# Patient Record
Sex: FEMALE | Race: WHITE | NOT HISPANIC OR LATINO
[De-identification: names, ages, dates, MRNs, and addresses within clinical notes are randomized per-mention and may not be internally consistent; named-entity substitution may affect disease eponyms.]

---

## 2017-12-06 ENCOUNTER — RESULT REVIEW (OUTPATIENT)
Age: 27
End: 2017-12-06

## 2018-12-19 ENCOUNTER — RESULT REVIEW (OUTPATIENT)
Age: 28
End: 2018-12-19

## 2019-07-10 PROBLEM — Z00.00 ENCOUNTER FOR PREVENTIVE HEALTH EXAMINATION: Status: ACTIVE | Noted: 2019-07-10

## 2019-07-29 ENCOUNTER — APPOINTMENT (OUTPATIENT)
Dept: OBGYN | Facility: CLINIC | Age: 29
End: 2019-07-29
Payer: COMMERCIAL

## 2019-07-29 DIAGNOSIS — Z78.9 OTHER SPECIFIED HEALTH STATUS: ICD-10-CM

## 2019-07-29 DIAGNOSIS — Z82.49 FAMILY HISTORY OF ISCHEMIC HEART DISEASE AND OTHER DISEASES OF THE CIRCULATORY SYSTEM: ICD-10-CM

## 2019-07-29 DIAGNOSIS — F17.200 NICOTINE DEPENDENCE, UNSPECIFIED, UNCOMPLICATED: ICD-10-CM

## 2019-07-29 PROCEDURE — 99202 OFFICE O/P NEW SF 15 MIN: CPT

## 2020-02-10 ENCOUNTER — APPOINTMENT (OUTPATIENT)
Dept: OBGYN | Facility: CLINIC | Age: 30
End: 2020-02-10
Payer: COMMERCIAL

## 2020-02-10 VITALS
HEIGHT: 65 IN | DIASTOLIC BLOOD PRESSURE: 60 MMHG | SYSTOLIC BLOOD PRESSURE: 90 MMHG | WEIGHT: 119 LBS | BODY MASS INDEX: 19.83 KG/M2

## 2020-02-10 DIAGNOSIS — Z01.419 ENCOUNTER FOR GYNECOLOGICAL EXAMINATION (GENERAL) (ROUTINE) W/OUT ABNORMAL FINDINGS: ICD-10-CM

## 2020-02-10 PROCEDURE — 99395 PREV VISIT EST AGE 18-39: CPT

## 2020-02-14 LAB — CYTOLOGY CVX/VAG DOC THIN PREP: ABNORMAL

## 2020-03-02 NOTE — END OF VISIT
[FreeTextEntry3] : Documented by Chichi Joshi acting as a scribe for Dr. Ying Bajwa. 03/02/2020 \par \par All medical record entries made by the Scribe were at my, Dr. Ying Bajwa, direction and personally dictated by me on 03/02/2020 . I have reviewed the chart and agree that the record accurately reflects my personal performance of the history, physical exam, assessment and plan. I have also personally directed, and reviewed the chart and I edited where appropriate.

## 2020-03-05 ENCOUNTER — RX CHANGE (OUTPATIENT)
Age: 30
End: 2020-03-05

## 2020-03-05 RX ORDER — ASCORBIC ACID, CHOLECALCIFEROL, .ALPHA.-TOCOPHEROL ACETATE, DL-, PYRIDOXINE, FOLIC ACID, CYANOCOBALAMIN, CALCIUM, FERROUS FUMARATE, MAGNESIUM, DOCONEXENT 85; 200; 10; 25; 1; 12; 140; 27; 45; 300 [IU]/1; [IU]/1; [IU]/1; [IU]/1; MG/1; UG/1; MG/1; MG/1; MG/1; MG/1
27-0.6-0.4-3 CAPSULE, GELATIN COATED ORAL DAILY
Qty: 30 | Refills: 11 | Status: DISCONTINUED | COMMUNITY
Start: 2019-07-29 | End: 2020-03-05

## 2020-12-23 PROBLEM — Z01.419 ENCOUNTER FOR GYNECOLOGICAL EXAMINATION WITHOUT ABNORMAL FINDING: Status: RESOLVED | Noted: 2020-02-10 | Resolved: 2020-12-23

## 2021-02-10 ENCOUNTER — APPOINTMENT (OUTPATIENT)
Dept: OBGYN | Facility: CLINIC | Age: 31
End: 2021-02-10
Payer: COMMERCIAL

## 2021-02-10 VITALS
DIASTOLIC BLOOD PRESSURE: 70 MMHG | BODY MASS INDEX: 19.66 KG/M2 | WEIGHT: 118 LBS | SYSTOLIC BLOOD PRESSURE: 120 MMHG | HEIGHT: 65 IN | TEMPERATURE: 96.8 F

## 2021-02-10 PROCEDURE — 36415 COLL VENOUS BLD VENIPUNCTURE: CPT

## 2021-02-10 PROCEDURE — 99395 PREV VISIT EST AGE 18-39: CPT

## 2021-02-10 PROCEDURE — 99072 ADDL SUPL MATRL&STAF TM PHE: CPT

## 2021-02-11 LAB
C TRACH RRNA SPEC QL NAA+PROBE: NOT DETECTED
CANDIDA VAG CYTO: NOT DETECTED
G VAGINALIS+PREV SP MTYP VAG QL MICRO: DETECTED
N GONORRHOEA RRNA SPEC QL NAA+PROBE: NOT DETECTED
PROGEST SERPL-MCNC: 14.7 NG/ML
SOURCE TP AMPLIFICATION: NORMAL
T VAGINALIS VAG QL WET PREP: NOT DETECTED

## 2021-02-16 LAB — CYTOLOGY CVX/VAG DOC THIN PREP: ABNORMAL

## 2021-02-22 LAB
DHEA-S SERPL-MCNC: 255 UG/DL
ESTRADIOL SERPL-MCNC: 22 PG/ML
FSH SERPL-MCNC: 5.8 IU/L
HCG SERPL-MCNC: <1 MIU/ML
LH SERPL-ACNC: 10.2 IU/L
PROLACTIN SERPL-MCNC: 16.9 NG/ML
TESTOST SERPL-MCNC: 26.2 NG/DL
TSH SERPL-ACNC: 0.66 UIU/ML

## 2021-04-22 ENCOUNTER — TRANSCRIPTION ENCOUNTER (OUTPATIENT)
Age: 31
End: 2021-04-22

## 2021-07-26 ENCOUNTER — TRANSCRIPTION ENCOUNTER (OUTPATIENT)
Age: 31
End: 2021-07-26

## 2022-01-18 ENCOUNTER — APPOINTMENT (OUTPATIENT)
Dept: OBGYN | Facility: CLINIC | Age: 32
End: 2022-01-18
Payer: COMMERCIAL

## 2022-01-18 VITALS
BODY MASS INDEX: 19.83 KG/M2 | HEIGHT: 65 IN | WEIGHT: 119 LBS | SYSTOLIC BLOOD PRESSURE: 100 MMHG | DIASTOLIC BLOOD PRESSURE: 72 MMHG

## 2022-01-18 PROCEDURE — 99213 OFFICE O/P EST LOW 20 MIN: CPT

## 2022-01-18 RX ORDER — METRONIDAZOLE 7.5 MG/G
0.75 GEL VAGINAL
Qty: 1 | Refills: 0 | Status: DISCONTINUED | COMMUNITY
Start: 2021-02-11 | End: 2022-01-18

## 2022-01-18 RX ORDER — DOXYCYCLINE HYCLATE 100 MG/1
100 CAPSULE ORAL
Qty: 10 | Refills: 0 | Status: DISCONTINUED | COMMUNITY
Start: 2021-02-10 | End: 2022-01-18

## 2022-01-18 RX ORDER — ASCORBIC ACID, CHOLECALCIFEROL, .ALPHA.-TOCOPHEROL ACETATE, DL-, PYRIDOXINE, FOLIC ACID, CYANOCOBALAMIN, CALCIUM, FERROUS FUMARATE, MAGNESIUM, DOCONEXENT 85; 200; 10; 25; 1; 12; 140; 27; 45; 300 [IU]/1; [IU]/1; [IU]/1; [IU]/1; MG/1; UG/1; MG/1; MG/1; MG/1; MG/1
27-0.6-0.4-3 CAPSULE, GELATIN COATED ORAL
Qty: 30 | Refills: 11 | Status: DISCONTINUED | COMMUNITY
Start: 2020-03-05 | End: 2022-01-18

## 2022-01-19 LAB
C TRACH RRNA SPEC QL NAA+PROBE: NOT DETECTED
N GONORRHOEA RRNA SPEC QL NAA+PROBE: NOT DETECTED
SOURCE AMPLIFICATION: NORMAL

## 2022-03-22 ENCOUNTER — APPOINTMENT (OUTPATIENT)
Dept: OBGYN | Facility: CLINIC | Age: 32
End: 2022-03-22
Payer: COMMERCIAL

## 2022-03-22 VITALS
DIASTOLIC BLOOD PRESSURE: 72 MMHG | SYSTOLIC BLOOD PRESSURE: 110 MMHG | HEIGHT: 65 IN | WEIGHT: 116 LBS | BODY MASS INDEX: 19.33 KG/M2

## 2022-03-22 PROCEDURE — 99395 PREV VISIT EST AGE 18-39: CPT

## 2022-04-06 ENCOUNTER — APPOINTMENT (OUTPATIENT)
Dept: OBGYN | Facility: CLINIC | Age: 32
End: 2022-04-06

## 2022-04-06 LAB — CYTOLOGY CVX/VAG DOC THIN PREP: NORMAL

## 2023-04-18 ENCOUNTER — APPOINTMENT (OUTPATIENT)
Dept: OBGYN | Facility: CLINIC | Age: 33
End: 2023-04-18
Payer: COMMERCIAL

## 2023-04-18 ENCOUNTER — LABORATORY RESULT (OUTPATIENT)
Age: 33
End: 2023-04-18

## 2023-04-18 VITALS
WEIGHT: 122 LBS | DIASTOLIC BLOOD PRESSURE: 64 MMHG | SYSTOLIC BLOOD PRESSURE: 120 MMHG | HEIGHT: 65 IN | BODY MASS INDEX: 20.33 KG/M2

## 2023-04-18 DIAGNOSIS — Z87.891 PERSONAL HISTORY OF NICOTINE DEPENDENCE: ICD-10-CM

## 2023-04-18 DIAGNOSIS — Z01.411 ENCOUNTER FOR GYNECOLOGICAL EXAMINATION (GENERAL) (ROUTINE) WITH ABNORMAL FINDINGS: ICD-10-CM

## 2023-04-18 PROCEDURE — 36415 COLL VENOUS BLD VENIPUNCTURE: CPT

## 2023-04-18 PROCEDURE — 76830 TRANSVAGINAL US NON-OB: CPT

## 2023-04-18 PROCEDURE — 99395 PREV VISIT EST AGE 18-39: CPT | Mod: 25

## 2023-04-18 RX ORDER — TERCONAZOLE 4 MG/G
0.4 CREAM VAGINAL DAILY
Qty: 1 | Refills: 1 | Status: DISCONTINUED | COMMUNITY
Start: 2022-03-22 | End: 2023-04-18

## 2023-04-18 RX ORDER — FLUCONAZOLE 150 MG/1
150 TABLET ORAL
Qty: 1 | Refills: 0 | Status: DISCONTINUED | COMMUNITY
Start: 2022-01-18 | End: 2023-04-18

## 2023-04-24 PROBLEM — Z87.891 FORMER SMOKER: Status: ACTIVE | Noted: 2023-04-18

## 2023-04-25 LAB
C TRACH RRNA SPEC QL NAA+PROBE: NOT DETECTED
CYTOLOGY CVX/VAG DOC THIN PREP: ABNORMAL
N GONORRHOEA RRNA SPEC QL NAA+PROBE: NOT DETECTED
SOURCE TP AMPLIFICATION: NORMAL

## 2023-05-11 LAB
BASOPHILS # BLD AUTO: 0.01 K/UL
BASOPHILS NFR BLD AUTO: 0.1 %
EOSINOPHIL # BLD AUTO: 0.19 K/UL
EOSINOPHIL NFR BLD AUTO: 1.9 %
HBV SURFACE AG SER QL: NONREACTIVE
HCT VFR BLD CALC: 39.2 %
HCV AB SER QL: NONREACTIVE
HCV S/CO RATIO: 0.32 S/CO
HGB BLD-MCNC: 13.4 G/DL
HIV1+2 AB SPEC QL IA.RAPID: NONREACTIVE
IMM GRANULOCYTES NFR BLD AUTO: 0.3 %
LYMPHOCYTES # BLD AUTO: 2.03 K/UL
LYMPHOCYTES NFR BLD AUTO: 20.5 %
MAN DIFF?: NORMAL
MCHC RBC-ENTMCNC: 31.1 PG
MCHC RBC-ENTMCNC: 34.2 GM/DL
MCV RBC AUTO: 91 FL
MEV IGG FLD QL IA: 65.8 AU/ML
MEV IGG+IGM SER-IMP: POSITIVE
MONOCYTES # BLD AUTO: 0.38 K/UL
MONOCYTES NFR BLD AUTO: 3.8 %
NEUTROPHILS # BLD AUTO: 7.26 K/UL
NEUTROPHILS NFR BLD AUTO: 73.4 %
PLATELET # BLD AUTO: 257 K/UL
RBC # BLD: 4.31 M/UL
RBC # FLD: 12.1 %
RUBV IGG FLD-ACNC: 4.7 INDEX
RUBV IGG SER-IMP: POSITIVE
T PALLIDUM AB SER QL IA: NEGATIVE
TSH SERPL-ACNC: 0.83 UIU/ML
VZV AB TITR SER: POSITIVE
VZV IGG SER IF-ACNC: 3077 INDEX
WBC # FLD AUTO: 9.9 K/UL

## 2023-05-14 LAB
HGB A MFR BLD: 97 %
HGB A2 MFR BLD: 3 %
HGB FRACT BLD-IMP: NORMAL

## 2023-05-15 ENCOUNTER — NON-APPOINTMENT (OUTPATIENT)
Age: 33
End: 2023-05-15

## 2023-05-15 ENCOUNTER — APPOINTMENT (OUTPATIENT)
Dept: ANTEPARTUM | Facility: CLINIC | Age: 33
End: 2023-05-15
Payer: COMMERCIAL

## 2023-05-15 ENCOUNTER — ASOB RESULT (OUTPATIENT)
Age: 33
End: 2023-05-15

## 2023-05-15 PROCEDURE — 76813 OB US NUCHAL MEAS 1 GEST: CPT

## 2023-05-15 PROCEDURE — 76801 OB US < 14 WKS SINGLE FETUS: CPT | Mod: 59

## 2023-05-16 ENCOUNTER — APPOINTMENT (OUTPATIENT)
Dept: OBGYN | Facility: CLINIC | Age: 33
End: 2023-05-16
Payer: COMMERCIAL

## 2023-05-16 ENCOUNTER — LABORATORY RESULT (OUTPATIENT)
Age: 33
End: 2023-05-16

## 2023-05-16 PROCEDURE — 0502F SUBSEQUENT PRENATAL CARE: CPT

## 2023-05-16 PROCEDURE — 36415 COLL VENOUS BLD VENIPUNCTURE: CPT

## 2023-05-17 LAB
B19V IGG SER QL IA: NEGATIVE
B19V IGG+IGM SER-IMP: NORMAL
B19V IGM FLD-ACNC: NEGATIVE

## 2023-05-24 ENCOUNTER — APPOINTMENT (OUTPATIENT)
Dept: OBGYN | Facility: CLINIC | Age: 33
End: 2023-05-24
Payer: COMMERCIAL

## 2023-05-24 PROCEDURE — 96372 THER/PROPH/DIAG INJ SC/IM: CPT

## 2023-06-13 ENCOUNTER — APPOINTMENT (OUTPATIENT)
Dept: OBGYN | Facility: CLINIC | Age: 33
End: 2023-06-13
Payer: COMMERCIAL

## 2023-06-13 VITALS
HEIGHT: 65 IN | SYSTOLIC BLOOD PRESSURE: 110 MMHG | BODY MASS INDEX: 20.99 KG/M2 | DIASTOLIC BLOOD PRESSURE: 62 MMHG | WEIGHT: 126 LBS

## 2023-06-13 PROCEDURE — 0502F SUBSEQUENT PRENATAL CARE: CPT

## 2023-06-13 PROCEDURE — 36415 COLL VENOUS BLD VENIPUNCTURE: CPT

## 2023-06-22 LAB
AFP MOM: 1.2
AFP VALUE: 46.7 NG/ML
ALPHA FETOPROTEIN SERUM COMMENT: NORMAL
ALPHA FETOPROTEIN SERUM INTERPRETATION: NORMAL
ALPHA FETOPROTEIN SERUM RESULTS: NORMAL
ALPHA FETOPROTEIN SERUM TEST RESULTS: NORMAL
GESTATIONAL AGE BASED ON: NORMAL
GESTATIONAL AGE ON COLLECTION DATE: 16 WEEKS
INSULIN DEP DIABETES: NO
MATERNAL AGE AT EDD AFP: 35.2 YR
MULTIPLE GESTATION: NO
OSBR RISK 1 IN: 6499
RACE: NORMAL
WEIGHT AFP: 125 LBS

## 2023-06-28 LAB
BILIRUB UR QL STRIP: NORMAL
GLUCOSE UR-MCNC: NORMAL
HCG UR QL: 0.2 EU/DL
HGB UR QL STRIP.AUTO: NORMAL
KETONES UR-MCNC: NORMAL
LEUKOCYTE ESTERASE UR QL STRIP: NORMAL
NITRITE UR QL STRIP: NORMAL
PH UR STRIP: 6
PROT UR STRIP-MCNC: NORMAL
SP GR UR STRIP: 1.03

## 2023-07-10 ENCOUNTER — ASOB RESULT (OUTPATIENT)
Age: 33
End: 2023-07-10

## 2023-07-10 ENCOUNTER — APPOINTMENT (OUTPATIENT)
Dept: ANTEPARTUM | Facility: CLINIC | Age: 33
End: 2023-07-10
Payer: COMMERCIAL

## 2023-07-10 PROCEDURE — 76811 OB US DETAILED SNGL FETUS: CPT

## 2023-07-11 ENCOUNTER — APPOINTMENT (OUTPATIENT)
Dept: OBGYN | Facility: CLINIC | Age: 33
End: 2023-07-11
Payer: COMMERCIAL

## 2023-07-11 VITALS
WEIGHT: 129 LBS | SYSTOLIC BLOOD PRESSURE: 120 MMHG | BODY MASS INDEX: 21.49 KG/M2 | HEIGHT: 65 IN | DIASTOLIC BLOOD PRESSURE: 60 MMHG

## 2023-07-11 LAB
BILIRUB UR QL STRIP: NORMAL
GLUCOSE UR-MCNC: NORMAL
HCG UR QL: 0.2 EU/DL
HGB UR QL STRIP.AUTO: NORMAL
KETONES UR-MCNC: NORMAL
LEUKOCYTE ESTERASE UR QL STRIP: NORMAL
NITRITE UR QL STRIP: NORMAL
PH UR STRIP: 6.5
PROT UR STRIP-MCNC: NORMAL
SP GR UR STRIP: 1.02

## 2023-07-11 PROCEDURE — 0502F SUBSEQUENT PRENATAL CARE: CPT

## 2023-07-13 LAB — BACTERIA UR CULT: NORMAL

## 2023-07-17 RX ORDER — RHO(D) IMMUNE GLOBULIN (HUMAN) 1500 [IU]/1
1500 SOLUTION INTRAMUSCULAR
Qty: 1 | Refills: 0 | Status: ACTIVE | COMMUNITY
Start: 2023-05-22 | End: 1900-01-01

## 2023-08-08 ENCOUNTER — APPOINTMENT (OUTPATIENT)
Dept: OBGYN | Facility: CLINIC | Age: 33
End: 2023-08-08
Payer: COMMERCIAL

## 2023-08-08 VITALS
HEIGHT: 65 IN | SYSTOLIC BLOOD PRESSURE: 110 MMHG | BODY MASS INDEX: 22.66 KG/M2 | DIASTOLIC BLOOD PRESSURE: 60 MMHG | WEIGHT: 136 LBS

## 2023-08-08 PROCEDURE — 0502F SUBSEQUENT PRENATAL CARE: CPT

## 2023-08-21 LAB
BILIRUB UR QL STRIP: NORMAL
GLUCOSE UR-MCNC: NORMAL
HCG UR QL: 1 EU/DL
HGB UR QL STRIP.AUTO: NORMAL
KETONES UR-MCNC: NORMAL
LEUKOCYTE ESTERASE UR QL STRIP: NORMAL
NITRITE UR QL STRIP: NORMAL
PH UR STRIP: 6
PROT UR STRIP-MCNC: NORMAL
SP GR UR STRIP: 1.02

## 2023-09-05 ENCOUNTER — APPOINTMENT (OUTPATIENT)
Dept: OBGYN | Facility: CLINIC | Age: 33
End: 2023-09-05
Payer: COMMERCIAL

## 2023-09-05 VITALS
SYSTOLIC BLOOD PRESSURE: 112 MMHG | DIASTOLIC BLOOD PRESSURE: 70 MMHG | HEIGHT: 65 IN | WEIGHT: 142 LBS | BODY MASS INDEX: 23.66 KG/M2

## 2023-09-05 PROCEDURE — 36415 COLL VENOUS BLD VENIPUNCTURE: CPT

## 2023-09-05 PROCEDURE — 96372 THER/PROPH/DIAG INJ SC/IM: CPT

## 2023-09-05 PROCEDURE — 0502F SUBSEQUENT PRENATAL CARE: CPT

## 2023-09-05 PROCEDURE — 76805 OB US >/= 14 WKS SNGL FETUS: CPT

## 2023-09-08 LAB
ABO + RH PNL BLD: NORMAL
BILIRUB UR QL STRIP: NORMAL
BLD GP AB SCN SERPL QL: NORMAL
GLUCOSE UR-MCNC: NORMAL
HCG UR QL: 1 EU/DL
HGB UR QL STRIP.AUTO: NORMAL
KETONES UR-MCNC: NORMAL
LEUKOCYTE ESTERASE UR QL STRIP: NORMAL
NITRITE UR QL STRIP: NORMAL
PH UR STRIP: 6
PROT UR STRIP-MCNC: NORMAL
SP GR UR STRIP: 1.03

## 2023-09-12 LAB — HIV1+2 AB SPEC QL IA.RAPID: NONREACTIVE

## 2023-09-19 ENCOUNTER — APPOINTMENT (OUTPATIENT)
Dept: OBGYN | Facility: CLINIC | Age: 33
End: 2023-09-19
Payer: COMMERCIAL

## 2023-09-19 VITALS
DIASTOLIC BLOOD PRESSURE: 60 MMHG | BODY MASS INDEX: 24.16 KG/M2 | WEIGHT: 145 LBS | SYSTOLIC BLOOD PRESSURE: 100 MMHG | HEIGHT: 65 IN

## 2023-09-19 PROCEDURE — 0502F SUBSEQUENT PRENATAL CARE: CPT

## 2023-10-03 ENCOUNTER — APPOINTMENT (OUTPATIENT)
Dept: OBGYN | Facility: CLINIC | Age: 33
End: 2023-10-03
Payer: COMMERCIAL

## 2023-10-03 VITALS
WEIGHT: 148 LBS | HEIGHT: 65 IN | SYSTOLIC BLOOD PRESSURE: 100 MMHG | DIASTOLIC BLOOD PRESSURE: 62 MMHG | BODY MASS INDEX: 24.66 KG/M2

## 2023-10-03 PROCEDURE — 0502F SUBSEQUENT PRENATAL CARE: CPT

## 2023-10-16 ENCOUNTER — ASOB RESULT (OUTPATIENT)
Age: 33
End: 2023-10-16

## 2023-10-16 ENCOUNTER — APPOINTMENT (OUTPATIENT)
Dept: ANTEPARTUM | Facility: CLINIC | Age: 33
End: 2023-10-16
Payer: COMMERCIAL

## 2023-10-16 PROCEDURE — 76816 OB US FOLLOW-UP PER FETUS: CPT

## 2023-10-16 PROCEDURE — 76819 FETAL BIOPHYS PROFIL W/O NST: CPT

## 2023-10-17 ENCOUNTER — APPOINTMENT (OUTPATIENT)
Dept: OBGYN | Facility: CLINIC | Age: 33
End: 2023-10-17
Payer: COMMERCIAL

## 2023-10-17 VITALS
BODY MASS INDEX: 24.99 KG/M2 | HEIGHT: 65 IN | WEIGHT: 150 LBS | SYSTOLIC BLOOD PRESSURE: 112 MMHG | DIASTOLIC BLOOD PRESSURE: 60 MMHG

## 2023-10-17 DIAGNOSIS — Z23 ENCOUNTER FOR IMMUNIZATION: ICD-10-CM

## 2023-10-17 PROCEDURE — 90471 IMMUNIZATION ADMIN: CPT

## 2023-10-17 PROCEDURE — 0502F SUBSEQUENT PRENATAL CARE: CPT

## 2023-10-17 PROCEDURE — 90715 TDAP VACCINE 7 YRS/> IM: CPT

## 2023-10-31 ENCOUNTER — APPOINTMENT (OUTPATIENT)
Dept: OBGYN | Facility: CLINIC | Age: 33
End: 2023-10-31
Payer: COMMERCIAL

## 2023-10-31 VITALS
WEIGHT: 153 LBS | SYSTOLIC BLOOD PRESSURE: 120 MMHG | BODY MASS INDEX: 25.49 KG/M2 | DIASTOLIC BLOOD PRESSURE: 60 MMHG | HEIGHT: 65 IN

## 2023-10-31 PROCEDURE — 0502F SUBSEQUENT PRENATAL CARE: CPT

## 2023-11-05 LAB
BILIRUB UR QL STRIP: NORMAL
GLUCOSE UR-MCNC: NORMAL
GP B STREP DNA SPEC QL NAA+PROBE: NOT DETECTED
HCG UR QL: 1 EU/DL
HGB UR QL STRIP.AUTO: NORMAL
KETONES UR-MCNC: NORMAL
LEUKOCYTE ESTERASE UR QL STRIP: NORMAL
NITRITE UR QL STRIP: NORMAL
PH UR STRIP: 6
PROT UR STRIP-MCNC: NORMAL
SOURCE GBS: NORMAL
SP GR UR STRIP: 1.02

## 2023-11-07 ENCOUNTER — APPOINTMENT (OUTPATIENT)
Dept: OBGYN | Facility: CLINIC | Age: 33
End: 2023-11-07
Payer: COMMERCIAL

## 2023-11-07 VITALS
WEIGHT: 154 LBS | SYSTOLIC BLOOD PRESSURE: 118 MMHG | DIASTOLIC BLOOD PRESSURE: 60 MMHG | BODY MASS INDEX: 25.66 KG/M2 | HEIGHT: 65 IN

## 2023-11-07 PROCEDURE — 0502F SUBSEQUENT PRENATAL CARE: CPT

## 2023-11-12 LAB
BILIRUB UR QL STRIP: NORMAL
GLUCOSE UR-MCNC: NORMAL
HCG UR QL: 0.2 EU/DL
HGB UR QL STRIP.AUTO: NORMAL
KETONES UR-MCNC: NORMAL
LEUKOCYTE ESTERASE UR QL STRIP: NORMAL
NITRITE UR QL STRIP: NORMAL
PH UR STRIP: 6
PROT UR STRIP-MCNC: NORMAL
SP GR UR STRIP: 1.02

## 2023-11-13 ENCOUNTER — ASOB RESULT (OUTPATIENT)
Age: 33
End: 2023-11-13

## 2023-11-13 ENCOUNTER — APPOINTMENT (OUTPATIENT)
Dept: ANTEPARTUM | Facility: CLINIC | Age: 33
End: 2023-11-13
Payer: COMMERCIAL

## 2023-11-13 PROCEDURE — 76816 OB US FOLLOW-UP PER FETUS: CPT

## 2023-11-13 PROCEDURE — 76819 FETAL BIOPHYS PROFIL W/O NST: CPT

## 2023-11-14 ENCOUNTER — APPOINTMENT (OUTPATIENT)
Dept: OBGYN | Facility: CLINIC | Age: 33
End: 2023-11-14
Payer: COMMERCIAL

## 2023-11-14 VITALS
SYSTOLIC BLOOD PRESSURE: 122 MMHG | WEIGHT: 156 LBS | DIASTOLIC BLOOD PRESSURE: 80 MMHG | HEIGHT: 65 IN | BODY MASS INDEX: 25.99 KG/M2

## 2023-11-14 PROCEDURE — 0502F SUBSEQUENT PRENATAL CARE: CPT

## 2023-11-21 ENCOUNTER — APPOINTMENT (OUTPATIENT)
Dept: OBGYN | Facility: CLINIC | Age: 33
End: 2023-11-21
Payer: COMMERCIAL

## 2023-11-21 VITALS
SYSTOLIC BLOOD PRESSURE: 120 MMHG | BODY MASS INDEX: 26.33 KG/M2 | HEIGHT: 65 IN | WEIGHT: 158 LBS | DIASTOLIC BLOOD PRESSURE: 62 MMHG

## 2023-11-21 PROCEDURE — 0502F SUBSEQUENT PRENATAL CARE: CPT

## 2023-11-27 LAB
BILIRUB UR QL STRIP: NORMAL
BILIRUB UR QL STRIP: NORMAL
GLUCOSE UR-MCNC: NORMAL
GLUCOSE UR-MCNC: NORMAL
HCG UR QL: 0.2 EU/DL
HCG UR QL: 1 EU/DL
HGB UR QL STRIP.AUTO: NORMAL
HGB UR QL STRIP.AUTO: NORMAL
KETONES UR-MCNC: NORMAL
KETONES UR-MCNC: NORMAL
LEUKOCYTE ESTERASE UR QL STRIP: NORMAL
LEUKOCYTE ESTERASE UR QL STRIP: NORMAL
NITRITE UR QL STRIP: NORMAL
NITRITE UR QL STRIP: NORMAL
PH UR STRIP: 6
PH UR STRIP: 6
PROT UR STRIP-MCNC: NORMAL
PROT UR STRIP-MCNC: NORMAL
SP GR UR STRIP: 1.02
SP GR UR STRIP: NORMAL

## 2023-11-30 ENCOUNTER — INPATIENT (INPATIENT)
Facility: HOSPITAL | Age: 33
LOS: 2 days | Discharge: HOME | End: 2023-12-03
Attending: OBSTETRICS & GYNECOLOGY | Admitting: OBSTETRICS & GYNECOLOGY
Payer: COMMERCIAL

## 2023-11-30 DIAGNOSIS — O26.899 OTHER SPECIFIED PREGNANCY RELATED CONDITIONS, UNSPECIFIED TRIMESTER: ICD-10-CM

## 2023-12-01 ENCOUNTER — TRANSCRIPTION ENCOUNTER (OUTPATIENT)
Age: 33
End: 2023-12-01

## 2023-12-01 VITALS — DIASTOLIC BLOOD PRESSURE: 90 MMHG | HEART RATE: 73 BPM | TEMPERATURE: 100 F | SYSTOLIC BLOOD PRESSURE: 136 MMHG

## 2023-12-01 DIAGNOSIS — O42.90 PREMATURE RUPTURE OF MEMBRANES, UNSPECIFIED AS TO LENGTH OF TIME BETWEEN RUPTURE AND ONSET OF LABOR, UNSPECIFIED WEEKS OF GESTATION: ICD-10-CM

## 2023-12-01 LAB
ALBUMIN SERPL ELPH-MCNC: 3.7 G/DL — SIGNIFICANT CHANGE UP (ref 3.3–5)
ALBUMIN SERPL ELPH-MCNC: 3.7 G/DL — SIGNIFICANT CHANGE UP (ref 3.3–5)
ALP SERPL-CCNC: 195 U/L — HIGH (ref 40–120)
ALP SERPL-CCNC: 195 U/L — HIGH (ref 40–120)
ALT FLD-CCNC: 15 U/L — SIGNIFICANT CHANGE UP (ref 4–33)
ALT FLD-CCNC: 15 U/L — SIGNIFICANT CHANGE UP (ref 4–33)
ANION GAP SERPL CALC-SCNC: 16 MMOL/L — HIGH (ref 7–14)
ANION GAP SERPL CALC-SCNC: 16 MMOL/L — HIGH (ref 7–14)
APPEARANCE UR: CLEAR — SIGNIFICANT CHANGE UP
APPEARANCE UR: CLEAR — SIGNIFICANT CHANGE UP
APTT BLD: 27.9 SEC — SIGNIFICANT CHANGE UP (ref 24.5–35.6)
APTT BLD: 27.9 SEC — SIGNIFICANT CHANGE UP (ref 24.5–35.6)
AST SERPL-CCNC: 29 U/L — SIGNIFICANT CHANGE UP (ref 4–32)
AST SERPL-CCNC: 29 U/L — SIGNIFICANT CHANGE UP (ref 4–32)
BACTERIA # UR AUTO: NEGATIVE /HPF — SIGNIFICANT CHANGE UP
BACTERIA # UR AUTO: NEGATIVE /HPF — SIGNIFICANT CHANGE UP
BASOPHILS # BLD AUTO: 0.02 K/UL — SIGNIFICANT CHANGE UP (ref 0–0.2)
BASOPHILS # BLD AUTO: 0.02 K/UL — SIGNIFICANT CHANGE UP (ref 0–0.2)
BASOPHILS NFR BLD AUTO: 0.1 % — SIGNIFICANT CHANGE UP (ref 0–2)
BASOPHILS NFR BLD AUTO: 0.1 % — SIGNIFICANT CHANGE UP (ref 0–2)
BILIRUB SERPL-MCNC: 0.5 MG/DL — SIGNIFICANT CHANGE UP (ref 0.2–1.2)
BILIRUB SERPL-MCNC: 0.5 MG/DL — SIGNIFICANT CHANGE UP (ref 0.2–1.2)
BILIRUB UR-MCNC: NEGATIVE — SIGNIFICANT CHANGE UP
BILIRUB UR-MCNC: NEGATIVE — SIGNIFICANT CHANGE UP
BLD GP AB SCN SERPL QL: POSITIVE — SIGNIFICANT CHANGE UP
BLD GP AB SCN SERPL QL: POSITIVE — SIGNIFICANT CHANGE UP
BUN SERPL-MCNC: 11 MG/DL — SIGNIFICANT CHANGE UP (ref 7–23)
BUN SERPL-MCNC: 11 MG/DL — SIGNIFICANT CHANGE UP (ref 7–23)
CALCIUM SERPL-MCNC: 9.3 MG/DL — SIGNIFICANT CHANGE UP (ref 8.4–10.5)
CALCIUM SERPL-MCNC: 9.3 MG/DL — SIGNIFICANT CHANGE UP (ref 8.4–10.5)
CAST: 1 /LPF — SIGNIFICANT CHANGE UP (ref 0–4)
CAST: 1 /LPF — SIGNIFICANT CHANGE UP (ref 0–4)
CHLORIDE SERPL-SCNC: 100 MMOL/L — SIGNIFICANT CHANGE UP (ref 98–107)
CHLORIDE SERPL-SCNC: 100 MMOL/L — SIGNIFICANT CHANGE UP (ref 98–107)
CO2 SERPL-SCNC: 20 MMOL/L — LOW (ref 22–31)
CO2 SERPL-SCNC: 20 MMOL/L — LOW (ref 22–31)
COLOR SPEC: YELLOW — SIGNIFICANT CHANGE UP
COLOR SPEC: YELLOW — SIGNIFICANT CHANGE UP
CREAT ?TM UR-MCNC: 147 MG/DL — SIGNIFICANT CHANGE UP
CREAT ?TM UR-MCNC: 147 MG/DL — SIGNIFICANT CHANGE UP
CREAT SERPL-MCNC: 0.71 MG/DL — SIGNIFICANT CHANGE UP (ref 0.5–1.3)
CREAT SERPL-MCNC: 0.71 MG/DL — SIGNIFICANT CHANGE UP (ref 0.5–1.3)
DIFF PNL FLD: ABNORMAL
DIFF PNL FLD: ABNORMAL
EGFR: 114 ML/MIN/1.73M2 — SIGNIFICANT CHANGE UP
EGFR: 114 ML/MIN/1.73M2 — SIGNIFICANT CHANGE UP
EOSINOPHIL # BLD AUTO: 0.02 K/UL — SIGNIFICANT CHANGE UP (ref 0–0.5)
EOSINOPHIL # BLD AUTO: 0.02 K/UL — SIGNIFICANT CHANGE UP (ref 0–0.5)
EOSINOPHIL NFR BLD AUTO: 0.1 % — SIGNIFICANT CHANGE UP (ref 0–6)
EOSINOPHIL NFR BLD AUTO: 0.1 % — SIGNIFICANT CHANGE UP (ref 0–6)
FIBRINOGEN PPP-MCNC: 368 MG/DL — SIGNIFICANT CHANGE UP (ref 200–465)
FIBRINOGEN PPP-MCNC: 368 MG/DL — SIGNIFICANT CHANGE UP (ref 200–465)
GLUCOSE SERPL-MCNC: 71 MG/DL — SIGNIFICANT CHANGE UP (ref 70–99)
GLUCOSE SERPL-MCNC: 71 MG/DL — SIGNIFICANT CHANGE UP (ref 70–99)
GLUCOSE UR QL: NEGATIVE MG/DL — SIGNIFICANT CHANGE UP
GLUCOSE UR QL: NEGATIVE MG/DL — SIGNIFICANT CHANGE UP
HCT VFR BLD CALC: 34.7 % — SIGNIFICANT CHANGE UP (ref 34.5–45)
HCT VFR BLD CALC: 34.7 % — SIGNIFICANT CHANGE UP (ref 34.5–45)
HCT VFR BLD CALC: 40 % — SIGNIFICANT CHANGE UP (ref 34.5–45)
HCT VFR BLD CALC: 40 % — SIGNIFICANT CHANGE UP (ref 34.5–45)
HGB BLD-MCNC: 11.9 G/DL — SIGNIFICANT CHANGE UP (ref 11.5–15.5)
HGB BLD-MCNC: 11.9 G/DL — SIGNIFICANT CHANGE UP (ref 11.5–15.5)
HGB BLD-MCNC: 14 G/DL — SIGNIFICANT CHANGE UP (ref 11.5–15.5)
HGB BLD-MCNC: 14 G/DL — SIGNIFICANT CHANGE UP (ref 11.5–15.5)
IANC: 15.51 K/UL — HIGH (ref 1.8–7.4)
IANC: 15.51 K/UL — HIGH (ref 1.8–7.4)
IMM GRANULOCYTES NFR BLD AUTO: 0.5 % — SIGNIFICANT CHANGE UP (ref 0–0.9)
IMM GRANULOCYTES NFR BLD AUTO: 0.5 % — SIGNIFICANT CHANGE UP (ref 0–0.9)
INR BLD: <0.9 RATIO — LOW (ref 0.85–1.18)
INR BLD: <0.9 RATIO — LOW (ref 0.85–1.18)
KETONES UR-MCNC: 15 MG/DL
KETONES UR-MCNC: 15 MG/DL
LDH SERPL L TO P-CCNC: 287 U/L — HIGH (ref 135–225)
LDH SERPL L TO P-CCNC: 287 U/L — HIGH (ref 135–225)
LEUKOCYTE ESTERASE UR-ACNC: NEGATIVE — SIGNIFICANT CHANGE UP
LEUKOCYTE ESTERASE UR-ACNC: NEGATIVE — SIGNIFICANT CHANGE UP
LYMPHOCYTES # BLD AUTO: 13.1 % — SIGNIFICANT CHANGE UP (ref 13–44)
LYMPHOCYTES # BLD AUTO: 13.1 % — SIGNIFICANT CHANGE UP (ref 13–44)
LYMPHOCYTES # BLD AUTO: 2.53 K/UL — SIGNIFICANT CHANGE UP (ref 1–3.3)
LYMPHOCYTES # BLD AUTO: 2.53 K/UL — SIGNIFICANT CHANGE UP (ref 1–3.3)
MCHC RBC-ENTMCNC: 31.7 PG — SIGNIFICANT CHANGE UP (ref 27–34)
MCHC RBC-ENTMCNC: 31.7 PG — SIGNIFICANT CHANGE UP (ref 27–34)
MCHC RBC-ENTMCNC: 31.9 PG — SIGNIFICANT CHANGE UP (ref 27–34)
MCHC RBC-ENTMCNC: 31.9 PG — SIGNIFICANT CHANGE UP (ref 27–34)
MCHC RBC-ENTMCNC: 34.3 GM/DL — SIGNIFICANT CHANGE UP (ref 32–36)
MCHC RBC-ENTMCNC: 34.3 GM/DL — SIGNIFICANT CHANGE UP (ref 32–36)
MCHC RBC-ENTMCNC: 35 GM/DL — SIGNIFICANT CHANGE UP (ref 32–36)
MCHC RBC-ENTMCNC: 35 GM/DL — SIGNIFICANT CHANGE UP (ref 32–36)
MCV RBC AUTO: 90.5 FL — SIGNIFICANT CHANGE UP (ref 80–100)
MCV RBC AUTO: 90.5 FL — SIGNIFICANT CHANGE UP (ref 80–100)
MCV RBC AUTO: 93 FL — SIGNIFICANT CHANGE UP (ref 80–100)
MCV RBC AUTO: 93 FL — SIGNIFICANT CHANGE UP (ref 80–100)
MONOCYTES # BLD AUTO: 1.19 K/UL — HIGH (ref 0–0.9)
MONOCYTES # BLD AUTO: 1.19 K/UL — HIGH (ref 0–0.9)
MONOCYTES NFR BLD AUTO: 6.1 % — SIGNIFICANT CHANGE UP (ref 2–14)
MONOCYTES NFR BLD AUTO: 6.1 % — SIGNIFICANT CHANGE UP (ref 2–14)
NEUTROPHILS # BLD AUTO: 15.51 K/UL — HIGH (ref 1.8–7.4)
NEUTROPHILS # BLD AUTO: 15.51 K/UL — HIGH (ref 1.8–7.4)
NEUTROPHILS NFR BLD AUTO: 80.1 % — HIGH (ref 43–77)
NEUTROPHILS NFR BLD AUTO: 80.1 % — HIGH (ref 43–77)
NITRITE UR-MCNC: NEGATIVE — SIGNIFICANT CHANGE UP
NITRITE UR-MCNC: NEGATIVE — SIGNIFICANT CHANGE UP
NRBC # BLD: 0 /100 WBCS — SIGNIFICANT CHANGE UP (ref 0–0)
NRBC # FLD: 0 K/UL — SIGNIFICANT CHANGE UP (ref 0–0)
PH UR: 5.5 — SIGNIFICANT CHANGE UP (ref 5–8)
PH UR: 5.5 — SIGNIFICANT CHANGE UP (ref 5–8)
PLATELET # BLD AUTO: 205 K/UL — SIGNIFICANT CHANGE UP (ref 150–400)
PLATELET # BLD AUTO: 205 K/UL — SIGNIFICANT CHANGE UP (ref 150–400)
PLATELET # BLD AUTO: 246 K/UL — SIGNIFICANT CHANGE UP (ref 150–400)
PLATELET # BLD AUTO: 246 K/UL — SIGNIFICANT CHANGE UP (ref 150–400)
POTASSIUM SERPL-MCNC: 4.3 MMOL/L — SIGNIFICANT CHANGE UP (ref 3.5–5.3)
POTASSIUM SERPL-MCNC: 4.3 MMOL/L — SIGNIFICANT CHANGE UP (ref 3.5–5.3)
POTASSIUM SERPL-SCNC: 4.3 MMOL/L — SIGNIFICANT CHANGE UP (ref 3.5–5.3)
POTASSIUM SERPL-SCNC: 4.3 MMOL/L — SIGNIFICANT CHANGE UP (ref 3.5–5.3)
PROT ?TM UR-MCNC: 18 MG/DL — SIGNIFICANT CHANGE UP
PROT ?TM UR-MCNC: 18 MG/DL — SIGNIFICANT CHANGE UP
PROT SERPL-MCNC: 6.6 G/DL — SIGNIFICANT CHANGE UP (ref 6–8.3)
PROT SERPL-MCNC: 6.6 G/DL — SIGNIFICANT CHANGE UP (ref 6–8.3)
PROT UR-MCNC: SIGNIFICANT CHANGE UP MG/DL
PROT UR-MCNC: SIGNIFICANT CHANGE UP MG/DL
PROT/CREAT UR-RTO: 0.1 RATIO — SIGNIFICANT CHANGE UP (ref 0–0.2)
PROT/CREAT UR-RTO: 0.1 RATIO — SIGNIFICANT CHANGE UP (ref 0–0.2)
PROTHROM AB SERPL-ACNC: 9.3 SEC — LOW (ref 9.5–13)
PROTHROM AB SERPL-ACNC: 9.3 SEC — LOW (ref 9.5–13)
RBC # BLD: 3.73 M/UL — LOW (ref 3.8–5.2)
RBC # BLD: 3.73 M/UL — LOW (ref 3.8–5.2)
RBC # BLD: 4.42 M/UL — SIGNIFICANT CHANGE UP (ref 3.8–5.2)
RBC # BLD: 4.42 M/UL — SIGNIFICANT CHANGE UP (ref 3.8–5.2)
RBC # FLD: 12.8 % — SIGNIFICANT CHANGE UP (ref 10.3–14.5)
RBC # FLD: 12.8 % — SIGNIFICANT CHANGE UP (ref 10.3–14.5)
RBC # FLD: 12.9 % — SIGNIFICANT CHANGE UP (ref 10.3–14.5)
RBC # FLD: 12.9 % — SIGNIFICANT CHANGE UP (ref 10.3–14.5)
RBC CASTS # UR COMP ASSIST: 46 /HPF — HIGH (ref 0–4)
RBC CASTS # UR COMP ASSIST: 46 /HPF — HIGH (ref 0–4)
RH IG SCN BLD-IMP: NEGATIVE — SIGNIFICANT CHANGE UP
SODIUM SERPL-SCNC: 136 MMOL/L — SIGNIFICANT CHANGE UP (ref 135–145)
SODIUM SERPL-SCNC: 136 MMOL/L — SIGNIFICANT CHANGE UP (ref 135–145)
SP GR SPEC: 1.02 — SIGNIFICANT CHANGE UP (ref 1–1.03)
SP GR SPEC: 1.02 — SIGNIFICANT CHANGE UP (ref 1–1.03)
SQUAMOUS # UR AUTO: 2 /HPF — SIGNIFICANT CHANGE UP (ref 0–5)
SQUAMOUS # UR AUTO: 2 /HPF — SIGNIFICANT CHANGE UP (ref 0–5)
T PALLIDUM AB TITR SER: NEGATIVE — SIGNIFICANT CHANGE UP
T PALLIDUM AB TITR SER: NEGATIVE — SIGNIFICANT CHANGE UP
URATE SERPL-MCNC: 5.6 MG/DL — SIGNIFICANT CHANGE UP (ref 2.5–7)
URATE SERPL-MCNC: 5.6 MG/DL — SIGNIFICANT CHANGE UP (ref 2.5–7)
UROBILINOGEN FLD QL: 0.2 MG/DL — SIGNIFICANT CHANGE UP (ref 0.2–1)
UROBILINOGEN FLD QL: 0.2 MG/DL — SIGNIFICANT CHANGE UP (ref 0.2–1)
WBC # BLD: 19.36 K/UL — HIGH (ref 3.8–10.5)
WBC # BLD: 19.36 K/UL — HIGH (ref 3.8–10.5)
WBC # BLD: 25.79 K/UL — HIGH (ref 3.8–10.5)
WBC # BLD: 25.79 K/UL — HIGH (ref 3.8–10.5)
WBC # FLD AUTO: 19.36 K/UL — HIGH (ref 3.8–10.5)
WBC # FLD AUTO: 19.36 K/UL — HIGH (ref 3.8–10.5)
WBC # FLD AUTO: 25.79 K/UL — HIGH (ref 3.8–10.5)
WBC # FLD AUTO: 25.79 K/UL — HIGH (ref 3.8–10.5)
WBC UR QL: 4 /HPF — SIGNIFICANT CHANGE UP (ref 0–5)
WBC UR QL: 4 /HPF — SIGNIFICANT CHANGE UP (ref 0–5)

## 2023-12-01 PROCEDURE — 86077 PHYS BLOOD BANK SERV XMATCH: CPT

## 2023-12-01 PROCEDURE — 59400 OBSTETRICAL CARE: CPT

## 2023-12-01 RX ORDER — HYDROCORTISONE 1 %
1 OINTMENT (GRAM) TOPICAL EVERY 6 HOURS
Refills: 0 | Status: DISCONTINUED | OUTPATIENT
Start: 2023-12-01 | End: 2023-12-03

## 2023-12-01 RX ORDER — IBUPROFEN 200 MG
600 TABLET ORAL EVERY 6 HOURS
Refills: 0 | Status: DISCONTINUED | OUTPATIENT
Start: 2023-12-01 | End: 2023-12-03

## 2023-12-01 RX ORDER — OXYTOCIN 10 UNIT/ML
41.67 VIAL (ML) INJECTION
Qty: 20 | Refills: 0 | Status: DISCONTINUED | OUTPATIENT
Start: 2023-12-01 | End: 2023-12-03

## 2023-12-01 RX ORDER — INFLUENZA VIRUS VACCINE 15; 15; 15; 15 UG/.5ML; UG/.5ML; UG/.5ML; UG/.5ML
0.5 SUSPENSION INTRAMUSCULAR ONCE
Refills: 0 | Status: DISCONTINUED | OUTPATIENT
Start: 2023-12-01 | End: 2023-12-03

## 2023-12-01 RX ORDER — CITRIC ACID/SODIUM CITRATE 300-500 MG
15 SOLUTION, ORAL ORAL EVERY 6 HOURS
Refills: 0 | Status: DISCONTINUED | OUTPATIENT
Start: 2023-12-01 | End: 2023-12-01

## 2023-12-01 RX ORDER — CHLORHEXIDINE GLUCONATE 213 G/1000ML
1 SOLUTION TOPICAL DAILY
Refills: 0 | Status: DISCONTINUED | OUTPATIENT
Start: 2023-12-01 | End: 2023-12-01

## 2023-12-01 RX ORDER — KETOROLAC TROMETHAMINE 30 MG/ML
30 SYRINGE (ML) INJECTION ONCE
Refills: 0 | Status: DISCONTINUED | OUTPATIENT
Start: 2023-12-01 | End: 2023-12-01

## 2023-12-01 RX ORDER — TETANUS TOXOID, REDUCED DIPHTHERIA TOXOID AND ACELLULAR PERTUSSIS VACCINE, ADSORBED 5; 2.5; 8; 8; 2.5 [IU]/.5ML; [IU]/.5ML; UG/.5ML; UG/.5ML; UG/.5ML
0.5 SUSPENSION INTRAMUSCULAR ONCE
Refills: 0 | Status: DISCONTINUED | OUTPATIENT
Start: 2023-12-01 | End: 2023-12-03

## 2023-12-01 RX ORDER — PRAMOXINE HYDROCHLORIDE 150 MG/15G
1 AEROSOL, FOAM RECTAL EVERY 4 HOURS
Refills: 0 | Status: DISCONTINUED | OUTPATIENT
Start: 2023-12-01 | End: 2023-12-03

## 2023-12-01 RX ORDER — MAGNESIUM HYDROXIDE 400 MG/1
30 TABLET, CHEWABLE ORAL
Refills: 0 | Status: DISCONTINUED | OUTPATIENT
Start: 2023-12-01 | End: 2023-12-03

## 2023-12-01 RX ORDER — SIMETHICONE 80 MG/1
80 TABLET, CHEWABLE ORAL EVERY 4 HOURS
Refills: 0 | Status: DISCONTINUED | OUTPATIENT
Start: 2023-12-01 | End: 2023-12-03

## 2023-12-01 RX ORDER — SODIUM CHLORIDE 9 MG/ML
500 INJECTION, SOLUTION INTRAVENOUS ONCE
Refills: 0 | Status: COMPLETED | OUTPATIENT
Start: 2023-12-01 | End: 2023-12-01

## 2023-12-01 RX ORDER — IBUPROFEN 200 MG
600 TABLET ORAL EVERY 6 HOURS
Refills: 0 | Status: COMPLETED | OUTPATIENT
Start: 2023-12-01 | End: 2024-10-29

## 2023-12-01 RX ORDER — DIPHENHYDRAMINE HCL 50 MG
25 CAPSULE ORAL EVERY 6 HOURS
Refills: 0 | Status: DISCONTINUED | OUTPATIENT
Start: 2023-12-01 | End: 2023-12-03

## 2023-12-01 RX ORDER — OXYTOCIN 10 UNIT/ML
VIAL (ML) INJECTION
Qty: 30 | Refills: 0 | Status: DISCONTINUED | OUTPATIENT
Start: 2023-12-01 | End: 2023-12-01

## 2023-12-01 RX ORDER — SODIUM CHLORIDE 9 MG/ML
3 INJECTION INTRAMUSCULAR; INTRAVENOUS; SUBCUTANEOUS EVERY 8 HOURS
Refills: 0 | Status: DISCONTINUED | OUTPATIENT
Start: 2023-12-01 | End: 2023-12-03

## 2023-12-01 RX ORDER — BENZOCAINE 10 %
1 GEL (GRAM) MUCOUS MEMBRANE EVERY 6 HOURS
Refills: 0 | Status: DISCONTINUED | OUTPATIENT
Start: 2023-12-01 | End: 2023-12-03

## 2023-12-01 RX ORDER — SODIUM CHLORIDE 9 MG/ML
1000 INJECTION, SOLUTION INTRAVENOUS
Refills: 0 | Status: DISCONTINUED | OUTPATIENT
Start: 2023-12-01 | End: 2023-12-01

## 2023-12-01 RX ORDER — OXYCODONE HYDROCHLORIDE 5 MG/1
5 TABLET ORAL
Refills: 0 | Status: DISCONTINUED | OUTPATIENT
Start: 2023-12-01 | End: 2023-12-03

## 2023-12-01 RX ORDER — LANOLIN
1 OINTMENT (GRAM) TOPICAL EVERY 6 HOURS
Refills: 0 | Status: DISCONTINUED | OUTPATIENT
Start: 2023-12-01 | End: 2023-12-03

## 2023-12-01 RX ORDER — AER TRAVELER 0.5 G/1
1 SOLUTION RECTAL; TOPICAL EVERY 4 HOURS
Refills: 0 | Status: DISCONTINUED | OUTPATIENT
Start: 2023-12-01 | End: 2023-12-03

## 2023-12-01 RX ORDER — OXYCODONE HYDROCHLORIDE 5 MG/1
5 TABLET ORAL ONCE
Refills: 0 | Status: DISCONTINUED | OUTPATIENT
Start: 2023-12-01 | End: 2023-12-03

## 2023-12-01 RX ORDER — DIBUCAINE 1 %
1 OINTMENT (GRAM) RECTAL EVERY 6 HOURS
Refills: 0 | Status: DISCONTINUED | OUTPATIENT
Start: 2023-12-01 | End: 2023-12-03

## 2023-12-01 RX ORDER — OXYTOCIN 10 UNIT/ML
333.33 VIAL (ML) INJECTION
Qty: 20 | Refills: 0 | Status: DISCONTINUED | OUTPATIENT
Start: 2023-12-01 | End: 2023-12-01

## 2023-12-01 RX ORDER — ACETAMINOPHEN 500 MG
975 TABLET ORAL
Refills: 0 | Status: DISCONTINUED | OUTPATIENT
Start: 2023-12-01 | End: 2023-12-03

## 2023-12-01 RX ADMIN — Medication 2 MILLIUNIT(S)/MIN: at 07:44

## 2023-12-01 RX ADMIN — SODIUM CHLORIDE 125 MILLILITER(S): 9 INJECTION, SOLUTION INTRAVENOUS at 06:40

## 2023-12-01 RX ADMIN — Medication 600 MILLIGRAM(S): at 20:08

## 2023-12-01 RX ADMIN — SODIUM CHLORIDE 3 MILLILITER(S): 9 INJECTION INTRAMUSCULAR; INTRAVENOUS; SUBCUTANEOUS at 15:15

## 2023-12-01 RX ADMIN — Medication 125 MILLIUNIT(S)/MIN: at 11:01

## 2023-12-01 RX ADMIN — SODIUM CHLORIDE 1000 MILLILITER(S): 9 INJECTION, SOLUTION INTRAVENOUS at 12:15

## 2023-12-01 RX ADMIN — CHLORHEXIDINE GLUCONATE 1 APPLICATION(S): 213 SOLUTION TOPICAL at 06:39

## 2023-12-01 RX ADMIN — Medication 975 MILLIGRAM(S): at 19:26

## 2023-12-01 RX ADMIN — Medication 30 MILLIGRAM(S): at 15:10

## 2023-12-01 RX ADMIN — Medication 30 MILLIGRAM(S): at 14:08

## 2023-12-01 RX ADMIN — Medication 975 MILLIGRAM(S): at 18:56

## 2023-12-01 NOTE — OB POSTPARTUM EVENT NOTE - NS_EVENTFINDINGS1_OBGYN_ALL_OB_FT
AVANI López at bedside at 12:00, manual extraction of clots, additional 170cc added to the QBL, Rectal cytotec 1000mcg given by AVANI López at 1205    500 CC LR bolus given at 1210 due to pt HR    as per AVANI López orders RN will continue to monitor bleeding as ordered

## 2023-12-01 NOTE — DISCHARGE NOTE OB - PATIENT PORTAL LINK FT
You can access the FollowMyHealth Patient Portal offered by [facility] by registering at the following website: [email or web address]. By joining [employer]’s FollowMyHealth portal, you will also be able to view your health information using other applications (apps) compatible with our system. You can access the FollowMyHealth Patient Portal offered by [facility] by registering at the following website: [email or web address]. By joining [employer]’s FollowMyHealth portal, you will also be able to view your health information using other applications (apps) compatible with our system. You can access the FollowMyHealth Patient Portal offered by [facility] by registering at the following website: [email or web address]. By joining [employer]’s FollowMyHealth portal, you will also be able to view your health information using other applications (apps) compatible with our system.

## 2023-12-01 NOTE — DISCHARGE NOTE OB - CARE PLAN
1 Principal Discharge DX:	Vacuum-assisted vaginal delivery  Assessment and plan of treatment:	normal diet and activity

## 2023-12-01 NOTE — OB NEONATOLOGY/PEDIATRICIAN DELIVERY SUMMARY - NSPEDSNEONOTESA_OBGYN_ALL_OB_FT
Pediatrician called to delivery for cat II tracings, [facility]. Male AGA infant born at 40.4 wks via  to a 34y/o  blood type A- mother (received Rhogam at 28 weeks). No significant maternal or prenatal history, on PNV and ASA durin pregnancy. Prenatal labs nr/immune/-, GBS- on 10/31. SROM at 2200 on  with clear fluids. highest maternal temperature 37.3. EOS 0.36. Terminal meconium. Baby emerged vigorous, crying. Cord clamping delayed 60sec. Infant was brought to radiant warmer and warmed, dried, stimulated and suctioned. HR>100, normal respiratory effort. APGARS of 8/9. Mom is initiating breast feeding. Consents to Hepatitis B vaccination. Desires for infant to be circumcised.     BW: 3510 g  : 2023  TOB: 10:59    Physical Exam:  Gen: no acute distress, +grimace  HEENT:  anterior fontanel open soft and flat, nondysmorphic facies, no cleft lip/palate, ears normal set, no ear pits or tags, nares clinically patent  Resp: Normal respiratory effort without grunting or retractions, good air entry b/l, clear to auscultation bilaterally  Cardio: Present S1/S2, regular rate and rhythm, no murmurs  Abd: soft, non tender, non distended, umbilical cord with 3 vessels  Neuro: +palmar and plantar grasp, +suck, +tariq, normal tone  Extremities: negative gonzáles and ortolani maneuvers, moving all extremities, no clavicular crepitus or stepoff  Skin: pink, warm  Genitals: Normal male anatomy, testicles palpable in scrotum b/l, Charles 1, anus patent

## 2023-12-01 NOTE — OB RN TRIAGE NOTE - FALL HARM RISK - UNIVERSAL INTERVENTIONS
Bed in lowest position, wheels locked, appropriate side rails in place/Call bell, personal items and telephone in reach/Instruct patient to call for assistance before getting out of bed or chair/Non-slip footwear when patient is out of bed/[place] to call system/Physically safe environment - no spills, clutter or unnecessary equipment/Purposeful Proactive Rounding/Room/bathroom lighting operational, light cord in reach
Fall Risk

## 2023-12-01 NOTE — DISCHARGE NOTE OB - CARE PROVIDERS DIRECT ADDRESSES
,[email or web address] ,[email or web address]

## 2023-12-01 NOTE — LACTATION INITIAL EVALUATION - LACTATION INTERVENTIONS
initiate/review safe skin-to-skin/initiate/review hand expression/initiate/review pumping guidelines and safe milk handling/initiate/review techniques for position and latch/review techniques to increase milk supply/initiate/review breast massage/compression/reviewed components of an effective feeding and at least 8 effective feedings per day required/reviewed importance of monitoring infant diapers, the breastfeeding log, and minimum output each day/reviewed strategies to transition to breastfeeding only/reviewed benefits and recommendations for rooming in/reviewed feeding on demand/by cue at least 8 times a day

## 2023-12-01 NOTE — OB RN DELIVERY SUMMARY - NSSELHIDDEN_OBGYN_ALL_OB_FT
Escorted to preop via wheelchair and accompanied by daughter and rn. Pt non weightbearing left lower extremity. Pt speaks Nepali but refuses  service. She prefers her daughter matthias as . Daughter states she understands medical terminology enough and will use rephrasing if needed for better understanding. preop completed without incident.   [NS_DeliveryAttending1_OBGYN_ALL_OB_FT:[id number]=],[NS_DeliveryAssist2_OBGYN_ALL_OB_FT:[id number]=],[NS_DeliveryRN_OBGYN_ALL_OB_FT:[id number]=]

## 2023-12-01 NOTE — OB RN DELIVERY SUMMARY - NS_SEPSISRSKCALC_OBGYN_ALL_OB_FT
EOS calculated successfully. EOS Risk Factor: 0.5/1000 live births ([facility] national incidence); GA=40w4d; Temp=99.5; ROM=12.983; GBS='Negative'; Antibiotics='No antibiotics or any antibiotics < 2 hrs prior to birth'

## 2023-12-01 NOTE — OB RN PATIENT PROFILE - FUNCTIONAL SCREEN CURRENT LEVEL: COMMUNICATION, MLM
[facility]    History and Physical - Hospitalist Service       Date of Admission:  6/16/2022    Assessment & Plan      Hima Griffiths is a 77 year old female admitted on 6/16/2022. She has a past medical history significant for atrial fibrillation, previous stroke, osteoarthritis, hypertension, hyperlipidemia, vertigo, and diabetes mellitus type 2.  She began having nausea, vomiting, diarrhea 6 days ago.  Has been feeling weak.  Daughter is noted that she has been started to get confused.  Had a fall earlier today.  Was unable to get up off the ground.  Brought to emergency room for further evaluation.  Found to have urinary tract infection.    1.  Urinary tract infection.  -Started on IV ceftriaxone in the emergency room.  -Continue IV ceftriaxone.  -Urine culture in process.    2.  Acute encephalopathy.  Suspect multifactorial.  UTI likely contributing.  Severe hyperglycemia likely contributing.  -Also check 2 blood cultures.  -Check chest x-ray.  -Check VBG, ammonia.    3.  Diabetes mellitus type 2.  Currently with severe hyperglycemia.  Probable hyperosmolar hyperglycemic state.  Has not had any insulin yet in the emergency room.  -Recheck glucose now.  -Continue IV fluids.  -Start aspart insulin sliding scale.  -Suspect she will need quite a bit more insulin once repeat level is checked.    4.  Atrial fibrillation.  Currently with mild rapid ventricular response.  -Restart rivaroxaban, metoprolol, diltiazem.    5.  Hyperlipidemia.  -Restart pravastatin.    6.  Hypertension.  -Restart diltiazem, lisinopril, metoprolol.  -IV hydralazine if needed.    7.  Nausea, vomiting, diarrhea.  Suspect viral gastroenteritis.  -Check C. difficile toxin.    8.  Deconditioning.  Likely due to above-noted urinary tract infection and suspected viral gastroenteritis.  -IV fluids.  -Treat UTI.  -Physical therapy consult.    9.  Dysphagia.  -Speech pathology consult.     Diet:  Regular diet.  DVT  Prophylaxis: DOAC  Hernandez Catheter: Not present  Central Lines: None  Cardiac Monitoring: None  Code Status:  Full code.    Clinically Significant Risk Factors Present on Admission             # Hypoalbuminemia: Albumin = 3.2 g/dL (Ref range: 3.4 - 5.0 g/dL) on admission, will monitor as appropriate   # Coagulation Defect: home medication list includes an anticoagulant medication  # Platelet Defect: home medication list includes an antiplatelet medication           Mitchell Waldrop DO  Hospitalist Service  [facility]  Securely message with the Vocera Web Console (learn more here)  Text page via [facility] Paging/Directory         ______________________________________________________________________    Chief Complaint   Weakness.    History is obtained from the patient    History of Present Illness   Hima Griffiths is a 77 year old female who has a past medical history significant for atrial fibrillation, previous stroke, osteoarthritis, hypertension, hyperlipidemia, vertigo, and diabetes mellitus type 2.  She began having nausea, vomiting, and diarrhea approximately 6 days ago.  Has continued to have nausea since that time.  Has had intermittent vomiting and diarrhea since this started.  Is also been feeling increasingly weak over the past few days.  Daughter has also noted that she seems confused.  Confusion seems to be getting worse today.  Earlier in the day, patient had an episode where she was trying to get up from a sitting position.  Did not feel that she had any strength.  Slowly fell to the ground.  States that she did not lose consciousness.  Did not hit her head.  However, she was unable to get up off the floor due to weakness.  Her daughter was unable to get a hold of her by phone.  She called for a welfare check.  Patient was found on the floor.  Brought to emergency room for further evaluation.  Nothing at home has been helping with symptoms since they started 6 days ago.  She  is feeling slightly better after IV fluids in the ER.  She did take her Trulicity as usual 4 days ago.  She also took her usually scheduled glipizide this morning.  Has not noticed any chest pain.  Not sure if she has had fevers.  Has had chills.  No shortness of breath.  Occasional mild cough.  She does note that she has been having problems feeling like she is sometimes choking when she swallows.  Has not been drinking as much fluid as she probably needs to.  No other acute complaints.    Review of Systems    The 10 point Review of Systems is negative other than noted in the HPI     Past Medical History    I have reviewed this patient's medical history and updated it with pertinent information if needed.   Past Medical History:   Diagnosis Date     Benign hypertension      Benign paroxysmal positional vertigo      Colitis 11/28/2016    Presumed infectious 2009.      Diabetes mellitus (H)      Family history of atrial fibrillation     Mother     Osteoarthritis of right knee, unspecified osteoarthritis type 8/2/2019     Persistent atrial fibrillation (H) 11/2015     Stroke (H) 10/2015       Past Surgical History   I have reviewed this patient's surgical history and updated it with pertinent information if needed.  Past Surgical History:   Procedure Laterality Date     AS LOOP RECORDER IMPLANT  11/9/15     CATARACT IOL, RT/LT  8/13/14       Social History   I have reviewed this patient's social history and updated it with pertinent information if needed.  Social History     Tobacco Use     Smoking status: Former Smoker     Start date: 10/3/1963     Quit date: 10/3/2006     Years since quitting: 15.7     Smokeless tobacco: Never Used     Tobacco comment: 30 years on and off less than a pack a day   Substance Use Topics     Alcohol use: Yes       Family History   I have reviewed this patient's family history and updated it with pertinent information if needed.  Family History   Problem Relation Age of Onset      Hypertension Mother      Arrhythmia Mother      Hypertension Father      Coronary Artery Disease Father        Prior to Admission Medications   Prior to Admission Medications   Prescriptions Last Dose Informant Patient Reported? Taking?   ACE/ARB NOT PRESCRIBED, INTENTIONAL,   No No   Sig: Please choose reason not prescribed, below   ASPIRIN NOT PRESCRIBED (INTENTIONAL)   Yes No   Sig: Please choose reason not prescribed, below   blood glucose (NO BRAND SPECIFIED) lancets standard   No No   Sig: Use to test blood sugar 2 times daily or as directed.   blood glucose (NO BRAND SPECIFIED) test strip   No No   Sig: Use to test blood sugar 2 times daily or as directed.   blood glucose monitoring (NO BRAND SPECIFIED) meter device kit   No No   Sig: Use to test blood sugar 2 times daily or as directed.   clotrimazole (LOTRIMIN) 1 % external cream   Yes No   Sig: Apply topically 2 times daily as needed   diltiazem (CARDIZEM) 30 MG tablet   No No   Sig: Take 1 tablet (30 mg) by mouth 2 times daily   dulaglutide (TRULICITY) 1.5 MG/0.5ML pen   No No   Sig: Inject 1.5 mg Subcutaneous every 7 days   glipiZIDE (GLUCOTROL XL) 10 MG 24 hr tablet   No No   Sig: Take 2 tablets (20 mg) by mouth daily   lisinopril (ZESTRIL) 2.5 MG tablet   No No   Sig: Take 2 tablets (5 mg) by mouth daily   lisinopril (ZESTRIL) 5 MG tablet   No No   Sig: Take 1 tablet (5 mg) by mouth daily   metoprolol tartrate (LOPRESSOR) 100 MG tablet   No No   Sig: Take 1 tablet (100 mg) by mouth 2 times daily   pravastatin (PRAVACHOL) 40 MG tablet   No No   Sig: Take 1 tablet (40 mg) by mouth daily   rivaroxaban ANTICOAGULANT (XARELTO ANTICOAGULANT) 20 MG TABS tablet   No No   Sig: Take 1 tablet (20 mg) by mouth daily (with dinner)      Facility-Administered Medications: None     Allergies   Allergies   Allergen Reactions     Alkylamines Other (See Comments)     Comment: Weakness tingling arms and legs, Description:      Sudafed [Pseudoephedrine]      Legs and arms  get weak       Physical Exam   Vital Signs: Temp: 97.3  F (36.3  C) Temp src: Temporal BP: (!) 132/106 Pulse: 112   Resp: 20 SpO2: 98 % O2 Device: None (Room air)    Weight: 0 lbs 0 oz    Gen:  NAD, A&Ox2 to person and place.  Trouble with time.  Eyes:  PERRL, sclera anicteric.  OP:  MMM, no lesions.  Neck:  Supple.  CV:  Irregular, mildly tachycardic, no murmurs.  Lung:  CTA b/l, normal effort.  Ab:  +BS, soft.  Skin:  Warm, dry to touch.  No rash.  Ext:  No pitting edema LE b/l.      Data   Data reviewed today: I reviewed all medications, new labs and imaging results over the last 24 hours. I personally reviewed the EKG tracing showing Atrial fibrillation, no obvious acute ischemic change.    Recent Labs   Lab 06/16/22  1415   WBC 15.4*   HGB 16.3*   *         POTASSIUM 4.8   CHLORIDE 100   CO2 24   BUN 24   CR 0.75   ANIONGAP 11   EZEKIEL 8.9   *   ALBUMIN 3.2*   PROTTOTAL 7.3   BILITOTAL 1.0   ALKPHOS 112   ALT 43   AST 35      0 = understands/communicates without difficulty

## 2023-12-01 NOTE — DISCHARGE NOTE OB - PROVIDER TOKENS
PROVIDER:[TOKEN:[[id number]]] PROVIDER:[TOKEN:[[id number]]] PROVIDER:[TOKEN:[[id number]]]

## 2023-12-01 NOTE — OB POSTPARTUM EVENT NOTE - NS_EVENTSUMMARY1_OBGYN_ALL_OB_FT
s/p VAVD of viable male at 1059, episiotomy RML,     1155 PP check: Uterus firm 1below umbilicus with heavy bleeding and clots expressed during assessment

## 2023-12-01 NOTE — OB PROVIDER DELIVERY SUMMARY - NSSELHIDDEN_OBGYN_ALL_OB_FT
[NS_DeliveryAttending1_OBGYN_ALL_OB_FT:[id number]=],[NS_DeliveryAssist2_OBGYN_ALL_OB_FT:[id number]=]

## 2023-12-01 NOTE — OB PROVIDER H&P - NSHPPHYSICALEXAM_GEN_ALL_CORE
T(C): 37.5 (12-01-23 @ 01:40), Max: 37.5 (12-01-23 @ 01:27)  HR: 75 (12-01-23 @ 01:43) (73 - 75)  BP: 137/80 (12-01-23 @ 01:43) (136/90 - 137/80)  RR: 18 (12-01-23 @ 01:40) (18 - 18)  SpO2: --  – PE:   CV: RRR  Pulm: breathing comfortably on RA  Abd: soft, gravid, nontender  Extr: moving all extremities with ease  TAUS: images saved in ASOB, vertex position  NST: baseline 145 FHR, moderate variability, + accels, - decels, reactive

## 2023-12-01 NOTE — OB PROVIDER H&P - ASSESSMENT
33y female  @40.4w, admit for PROM @ 10:30 pm on .    - Admit to Labor and Delivery  - Continuous EFM  - Clear diet, IV fluids  - Consent signed  - Standard labs (T&S, CBC, RPR)  - Epidural PRN  - Expectant management  - Discussed with Dr. Bajwa

## 2023-12-01 NOTE — CHART NOTE - NSCHARTNOTEFT_GEN_A_CORE
Delayed charting 2/2 clinical duties    Called to bedside at 1pm by RN who reported that pt had moderate amount of VB & tachycardia HR 130s. Pt reports feeling palpitations but overall feels well. s/p VAVD w/ 550cc QBL.     Vital Signs Last 24 Hrs  T(C): 36.7 (01 Dec 2023 12:40), Max: 37.5 (01 Dec 2023 01:27)  T(F): 98.1 (01 Dec 2023 12:40), Max: 99.5 (01 Dec 2023 01:27)  HR: 107 (01 Dec 2023 14:01) (71 - 146)  BP: 134/90 (01 Dec 2023 13:59) (102/53 - 158/88)  BP(mean): --  RR: 17 (01 Dec 2023 08:29) (15 - 20)  SpO2: 97% (01 Dec 2023 14:01) (70% - 99%)    Parameters below as of 01 Dec 2023 02:51  Patient On (Oxygen Delivery Method): room air    Abd: fundus atonic, midline @ umbillicus  Perineum: repair well approximated, lochia moderate with clots expressed.   Bimanual: additoinal clots expressed on bimanual for additonal QBL 170cc. After bimanual, fundus form 2FB below U.    32y/o  VAVD PPD0 with PPH (+ 170 for total ).     Plan  -rectal cytotec placed  -500cc LR bolus  -monitor fundus, VB & VS per routine  -CBC    Dr Roper PGY4 updated, Dr Alona Beltran CNM

## 2023-12-01 NOTE — OB PROVIDER IHI INDUCTION/AUGMENTATION NOTE - NS_OBIHIATTENDATTEST_OBGYN_ALL_OB
Please review for refill   Thank you
I have reviewed the history and the physical examination of the patient, as well as the assessment and plan, as was entered by the resident physician or the mid-level provider. I agree with the plan to induce or augment labor, and in my opinion, at this time, the use of Pitocin, and/or other induction agent(s), is safe and beneficial to mother and fetus.

## 2023-12-01 NOTE — DISCHARGE NOTE OB - NS MD DC FALL RISK RISK
For information on Fall & Injury Prevention, visit: [email or web address]-[email or web address]-[email or web address]-mobility OR  [email or web address]://[email or web address]/[email or web address]/fall-prevention-[email or web address]-injury OR  https://www.cdc.gov/steadi/patient.html For information on Fall & Injury Prevention, visit: [email or web address]://www.[facility]/news/fall-prevention-protects-and-maintains-health-and-mobility OR  https://www.[facility]/news/fall-prevention-tips-to-avoid-injury OR  https://www.cdc.gov/steadi/patient.html For information on Fall & Injury Prevention, visit: https://www.[facility]/news/fall-prevention-protects-and-maintains-health-and-mobility OR  https://www.[facility]/news/fall-prevention-tips-to-avoid-injury OR  https://www.cdc.gov/steadi/patient.html

## 2023-12-01 NOTE — DISCHARGE NOTE OB - DISCHARGE DATE
Onset: Yesterday 7/30/2023, some spotting 2 weeks ago  Location/Description: Vaginal discharge  Pain scale: 0/10  Associated symptoms: no yellow or green discharge, slighlty pinkish/brown, no fever, some stomach upset, no foul odor   Recent Care: Dr. Raza on 7/24/2023    Advised patient to schedule an OV within 3 days.  Patient is agreeable and is scheduled with Amy Matamoros on 8/2/2023 at 3:00 pm.     Advised patient if vaginal discharge becomes bright red or she experiences severe abdominal pain to proceed to her nearest ER.       03-Dec-2023

## 2023-12-01 NOTE — OB PROVIDER H&P - HISTORY OF PRESENT ILLNESS
33y , EGA@ 40.4 weeks, presents for leakage of fluid at 10:30 pm and ctx q 5 minutes. Patient reports  + fetal movements, denies vaginal bleeding. Pt denies any other concerns.  Antepartum course is significant for:  - GBS negative

## 2023-12-01 NOTE — LACTATION INITIAL EVALUATION - INTERVENTION OUTCOME
Assisted with deep latch and positioning by putting infant in football position on the left side. Infant had a wide gape and latched on. Infant demonstrated a RS with a SSB pattern. Patient educated about infants less than 24h of age being sleepy. Patient was made aware of cluster feeding that occurs after 24h of life and to be cautious of sleep deprivation. In order to maintain infant and patient safety, patient was instructed to place infant in bassinet or call for assistance as needed. Guide to postpartum and  care book was reviewed. Patient was educated on the nutritional needs of the baby and how many wet and dirty diapers to expect. Recognition of feeding cues and to feed the baby on demand, based on cues,  and at least 8-12 times in a day was discussed. Instructed patient to wake the baby to feed if no feeding cues are seen within 3h since prior feed.  Use of feeding log to record feedings along with wet and dirty diapers was encouraged. Instructed in hand expression with good return demonstration.  Reviewed safe skin to skin. Patient verbalized understanding of  information and education given. All questions and concerns were answered./verbalizes understanding/good return demonstration/needs met

## 2023-12-01 NOTE — OB PROVIDER H&P - NSLOWPPHRISK_OBGYN_A_OB
No previous uterine incision/[place] Pregnancy/Less than or equal to 4 previous vaginal births/No known bleeding disorder/No history of postpartum hemorrhage/No other PPH risks indicated

## 2023-12-01 NOTE — DISCHARGE NOTE OB - CARE PROVIDER_API CALL
Ying Bajwa  Obstetrics and Gynecology  [address], [address]  [place] [address]  Phone: [phone]  Fax: [phone]  Follow Up Time:    Ying Bajwa  Obstetrics and Gynecology  [address]  [place] [address]  Phone: [phone]  Fax: [phone]  Follow Up Time:    Ying Bajwa  Obstetrics and Gynecology  [address]  [place] [address]  Phone: [phone]  Fax: [phone]  Follow Up Time:

## 2023-12-01 NOTE — DISCHARGE NOTE OB - MEDICATION SUMMARY - MEDICATIONS TO TAKE
I will START or STAY ON the medications listed below when I get home from the hospital:    Electronic Blood Pressure Monitor  -- Take Blood Pressure Three Times Daily (breakfast, lunch, dinner)   If Blood pressure is greater than 140/90 Please call MD office  If greated than 160/110 please report to ED for evaluation  Call MD if you have headache that does not go away with tylenol, Heartburn unrelieved with TUMS, changes in your vision, nausea and vomitting  -- Indication: For     ibuprofen 600 mg oral tablet  -- 1 tab(s) by mouth every 6 hours  -- Indication: For     acetaminophen 325 mg oral tablet  -- 3 tab(s) by mouth every 6 hours  -- Indication: For

## 2023-12-02 LAB
HCT VFR BLD CALC: 26.5 % — LOW (ref 34.5–45)
HCT VFR BLD CALC: 26.5 % — LOW (ref 34.5–45)
HGB BLD-MCNC: 9.4 G/DL — LOW (ref 11.5–15.5)
HGB BLD-MCNC: 9.4 G/DL — LOW (ref 11.5–15.5)
MCHC RBC-ENTMCNC: 32.1 PG — SIGNIFICANT CHANGE UP (ref 27–34)
MCHC RBC-ENTMCNC: 32.1 PG — SIGNIFICANT CHANGE UP (ref 27–34)
MCHC RBC-ENTMCNC: 35.5 GM/DL — SIGNIFICANT CHANGE UP (ref 32–36)
MCHC RBC-ENTMCNC: 35.5 GM/DL — SIGNIFICANT CHANGE UP (ref 32–36)
MCV RBC AUTO: 90.4 FL — SIGNIFICANT CHANGE UP (ref 80–100)
MCV RBC AUTO: 90.4 FL — SIGNIFICANT CHANGE UP (ref 80–100)
NRBC # BLD: 0 /100 WBCS — SIGNIFICANT CHANGE UP (ref 0–0)
NRBC # BLD: 0 /100 WBCS — SIGNIFICANT CHANGE UP (ref 0–0)
NRBC # FLD: 0 K/UL — SIGNIFICANT CHANGE UP (ref 0–0)
NRBC # FLD: 0 K/UL — SIGNIFICANT CHANGE UP (ref 0–0)
PLATELET # BLD AUTO: 179 K/UL — SIGNIFICANT CHANGE UP (ref 150–400)
PLATELET # BLD AUTO: 179 K/UL — SIGNIFICANT CHANGE UP (ref 150–400)
RBC # BLD: 2.93 M/UL — LOW (ref 3.8–5.2)
RBC # BLD: 2.93 M/UL — LOW (ref 3.8–5.2)
RBC # FLD: 12.6 % — SIGNIFICANT CHANGE UP (ref 10.3–14.5)
RBC # FLD: 12.6 % — SIGNIFICANT CHANGE UP (ref 10.3–14.5)
WBC # BLD: 16.5 K/UL — HIGH (ref 3.8–10.5)
WBC # BLD: 16.5 K/UL — HIGH (ref 3.8–10.5)
WBC # FLD AUTO: 16.5 K/UL — HIGH (ref 3.8–10.5)
WBC # FLD AUTO: 16.5 K/UL — HIGH (ref 3.8–10.5)

## 2023-12-02 RX ORDER — ACETAMINOPHEN 500 MG
3 TABLET ORAL
Qty: 0 | Refills: 0 | DISCHARGE
Start: 2023-12-02

## 2023-12-02 RX ORDER — IBUPROFEN 200 MG
1 TABLET ORAL
Qty: 0 | Refills: 0 | DISCHARGE
Start: 2023-12-02

## 2023-12-02 RX ORDER — SENNA PLUS 8.6 MG/1
1 TABLET ORAL
Refills: 0 | Status: DISCONTINUED | OUTPATIENT
Start: 2023-12-02 | End: 2023-12-03

## 2023-12-02 RX ADMIN — Medication 600 MILLIGRAM(S): at 15:30

## 2023-12-02 RX ADMIN — Medication 975 MILLIGRAM(S): at 12:30

## 2023-12-02 RX ADMIN — Medication 600 MILLIGRAM(S): at 03:39

## 2023-12-02 RX ADMIN — Medication 975 MILLIGRAM(S): at 00:52

## 2023-12-02 RX ADMIN — Medication 975 MILLIGRAM(S): at 06:02

## 2023-12-02 RX ADMIN — Medication 975 MILLIGRAM(S): at 00:22

## 2023-12-02 RX ADMIN — SENNA PLUS 1 TABLET(S): 8.6 TABLET ORAL at 12:03

## 2023-12-02 RX ADMIN — Medication 975 MILLIGRAM(S): at 18:15

## 2023-12-02 RX ADMIN — Medication 600 MILLIGRAM(S): at 07:58

## 2023-12-02 RX ADMIN — Medication 975 MILLIGRAM(S): at 17:37

## 2023-12-02 RX ADMIN — Medication 600 MILLIGRAM(S): at 03:09

## 2023-12-02 RX ADMIN — SODIUM CHLORIDE 3 MILLILITER(S): 9 INJECTION INTRAMUSCULAR; INTRAVENOUS; SUBCUTANEOUS at 13:00

## 2023-12-02 RX ADMIN — Medication 600 MILLIGRAM(S): at 14:57

## 2023-12-02 RX ADMIN — Medication 600 MILLIGRAM(S): at 21:55

## 2023-12-02 RX ADMIN — Medication 600 MILLIGRAM(S): at 21:05

## 2023-12-02 RX ADMIN — Medication 975 MILLIGRAM(S): at 11:59

## 2023-12-02 RX ADMIN — Medication 1 TABLET(S): at 11:59

## 2023-12-02 RX ADMIN — Medication 600 MILLIGRAM(S): at 08:30

## 2023-12-02 NOTE — PROGRESS NOTE ADULT - SUBJECTIVE AND OBJECTIVE BOX
OB Progress Note: VAVD PPD#1    S: 32yo PPD#1 s/p VAVD. Patient feels well. Pain is well controlled. She is tolerating a regular diet and passing flatus. She is voiding spontaneously, and ambulating without difficulty. Denies CP/SOB. Denies lightheadedness/dizziness. Denies N/V.  a/p course complicated by gHTN    O:  Vitals:   Vital Signs Last 24 Hrs  T(C): 36.5 (02 Dec 2023 09:43), Max: 36.7 (01 Dec 2023 12:40)  T(F): 97.7 (02 Dec 2023 09:43), Max: 98.1 (01 Dec 2023 12:40)  HR: 85 (02 Dec 2023 09:43) (83 - 146)  BP: 116/60 (02 Dec 2023 09:43) (102/67 - 147/69)  BP(mean): --  RR: 19 (02 Dec 2023 09:43) (18 - 19)  SpO2: 98% (02 Dec 2023 09:43) (70% - 99%)        MEDICATIONS  (STANDING):  acetaminophen     Tablet .. 975 milliGRAM(s) Oral <User Schedule>  diphtheria/tetanus/pertussis (acellular) Vaccine (Adacel) 0.5 milliLiter(s) IntraMuscular once  ibuprofen  Tablet. 600 milliGRAM(s) Oral every 6 hours  influenza   Vaccine 0.5 milliLiter(s) IntraMuscular once  oxytocin Infusion 41.667 milliUNIT(s)/Min (125 mL/Hr) IV Continuous <Continuous>  prenatal multivitamin 1 Tablet(s) Oral daily  sodium chloride 0.9% lock flush 3 milliLiter(s) IV Push every 8 hours    MEDICATIONS  (PRN):  benzocaine 20%/menthol 0.5% Spray 1 Spray(s) Topical every 6 hours PRN for Perineal discomfort  dibucaine 1% Ointment 1 Application(s) Topical every 6 hours PRN Perineal discomfort  diphenhydrAMINE 25 milliGRAM(s) Oral every 6 hours PRN Pruritus  hydrocortisone 1% Cream 1 Application(s) Topical every 6 hours PRN Moderate Pain (4-6)  lanolin Ointment 1 Application(s) Topical every 6 hours PRN nipple soreness  magnesium hydroxide Suspension 30 milliLiter(s) Oral two times a day PRN Constipation  oxyCODONE    IR 5 milliGRAM(s) Oral once PRN Moderate to Severe Pain (4-10)  oxyCODONE    IR 5 milliGRAM(s) Oral every 3 hours PRN Moderate to Severe Pain (4-10)  pramoxine 1%/zinc 5% Cream 1 Application(s) Topical every 4 hours PRN Moderate Pain (4-6)  senna 1 Tablet(s) Oral two times a day PRN Constipation  simethicone 80 milliGRAM(s) Chew every 4 hours PRN Gas  witch hazel Pads 1 Application(s) Topical every 4 hours PRN Perineal discomfort      Labs:  Blood type: A Negative  Rubella IgG: RPR: Negative                          9.4<L>   16.50<H> >-----------< 179    ( 12-02 @ 06:00 )             26.5<L>                        11.9   25.79<H> >-----------< 205    ( 12-01 @ 12:15 )             34.7                        14.0   19.36<H> >-----------< 246    ( 12-01 @ 01:55 )             40.0    12-01-23 @ 03:30      136  |  100  |  11  ----------------------------<  71  4.3   |  20<L>  |  0.71        Ca    9.3      01 Dec 2023 03:30    TPro  6.6  /  Alb  3.7  /  TBili  0.5  /  DBili  x   /  AST  29  /  ALT  15  /  AlkPhos  195<H>  12-01-23 @ 03:30          Physical Exam:  General: NAD  Abdomen: soft, non-tender, non-distended, fundus firm  Vaginal: Lochia wnl  Extremities: No erythema/edema    A/P: 32yo PPD#1 s/p VAVD.  Patient is stable and doing well post-partum.    - Pain well controlled, continue current pain regimen  - Increase ambulation, SCDs when not ambulating  - Continue regular diet  -BP monitoring reviewed; cuff sent to [facility]  --take BP TID  --report BP of 140/90 to MD office  --Return to triage if /110  --reviewed signs and symptoms of pre-eclampsia with patient such as HA unrelieved with Tylenol, RUQ, N/V or visual disturbances   -++ Flatus   -Bleeding precautions, pelvic precautions, nothing per vaginal x 6 weeks   -Reviewed signs and symptoms to report to OB or to return to ED for assessment   -Report BP readings to MD in 1 week    -Routine 6 week postpartum follow up discussed   - Discharge planning   -All questons answered, patient verbalized understanding     Jolene CUNNINGHAM

## 2023-12-03 VITALS
RESPIRATION RATE: 19 BRPM | TEMPERATURE: 98 F | DIASTOLIC BLOOD PRESSURE: 67 MMHG | HEART RATE: 92 BPM | OXYGEN SATURATION: 99 % | SYSTOLIC BLOOD PRESSURE: 116 MMHG

## 2023-12-03 RX ADMIN — Medication 975 MILLIGRAM(S): at 00:19

## 2023-12-03 RX ADMIN — Medication 975 MILLIGRAM(S): at 07:28

## 2023-12-03 RX ADMIN — Medication 975 MILLIGRAM(S): at 00:50

## 2023-12-03 RX ADMIN — Medication 600 MILLIGRAM(S): at 08:53

## 2023-12-03 RX ADMIN — Medication 600 MILLIGRAM(S): at 03:02

## 2023-12-03 RX ADMIN — Medication 600 MILLIGRAM(S): at 03:45

## 2023-12-03 RX ADMIN — Medication 975 MILLIGRAM(S): at 06:30

## 2023-12-04 PROBLEM — Z78.9 OTHER SPECIFIED HEALTH STATUS: Chronic | Status: ACTIVE | Noted: 2023-12-01

## 2023-12-11 ENCOUNTER — APPOINTMENT (OUTPATIENT)
Dept: OBGYN | Facility: CLINIC | Age: 33
End: 2023-12-11
Payer: COMMERCIAL

## 2023-12-11 VITALS
WEIGHT: 139 LBS | DIASTOLIC BLOOD PRESSURE: 76 MMHG | HEIGHT: 65 IN | BODY MASS INDEX: 23.16 KG/M2 | SYSTOLIC BLOOD PRESSURE: 120 MMHG

## 2023-12-11 DIAGNOSIS — Z87.898 PERSONAL HISTORY OF OTHER SPECIFIED CONDITIONS: ICD-10-CM

## 2023-12-11 DIAGNOSIS — Z11.3 ENCOUNTER FOR SCREENING FOR INFECTIONS WITH A PREDOMINANTLY SEXUAL MODE OF TRANSMISSION: ICD-10-CM

## 2023-12-11 DIAGNOSIS — Z3A.37 37 WEEKS GESTATION OF PREGNANCY: ICD-10-CM

## 2023-12-11 DIAGNOSIS — Z67.91 UNSPECIFIED BLOOD TYPE, RH NEGATIVE: ICD-10-CM

## 2023-12-11 DIAGNOSIS — Z86.19 PERSONAL HISTORY OF OTHER INFECTIOUS AND PARASITIC DISEASES: ICD-10-CM

## 2023-12-11 DIAGNOSIS — Z31.69 ENCOUNTER FOR OTHER GENERAL COUNSELING AND ADVICE ON PROCREATION: ICD-10-CM

## 2023-12-11 DIAGNOSIS — Z87.42 PERSONAL HISTORY OF OTHER DISEASES OF THE FEMALE GENITAL TRACT: ICD-10-CM

## 2023-12-11 DIAGNOSIS — Z36.85 ENCOUNTER FOR ANTENATAL SCREENING FOR STREPTOCOCCUS B: ICD-10-CM

## 2023-12-11 DIAGNOSIS — Z13.79 ENCOUNTER FOR OTHER SCREENING FOR GENETIC AND CHROMOSOMAL ANOMALIES: ICD-10-CM

## 2023-12-11 DIAGNOSIS — Z3A.24 24 WEEKS GESTATION OF PREGNANCY: ICD-10-CM

## 2023-12-11 DIAGNOSIS — Z3A.12 12 WEEKS GESTATION OF PREGNANCY: ICD-10-CM

## 2023-12-11 DIAGNOSIS — Z3A.32 32 WEEKS GESTATION OF PREGNANCY: ICD-10-CM

## 2023-12-11 DIAGNOSIS — Z3A.34 34 WEEKS GESTATION OF PREGNANCY: ICD-10-CM

## 2023-12-11 DIAGNOSIS — N75.0 CYST OF BARTHOLIN'S GLAND: ICD-10-CM

## 2023-12-11 DIAGNOSIS — Z3A.16 16 WEEKS GESTATION OF PREGNANCY: ICD-10-CM

## 2023-12-11 DIAGNOSIS — Z3A.20 20 WEEKS GESTATION OF PREGNANCY: ICD-10-CM

## 2023-12-11 DIAGNOSIS — Z3A.30 30 WEEKS GESTATION OF PREGNANCY: ICD-10-CM

## 2023-12-11 DIAGNOSIS — N97.9 FEMALE INFERTILITY, UNSPECIFIED: ICD-10-CM

## 2023-12-11 DIAGNOSIS — N93.0 POSTCOITAL AND CONTACT BLEEDING: ICD-10-CM

## 2023-12-11 DIAGNOSIS — Z3A.36 36 WEEKS GESTATION OF PREGNANCY: ICD-10-CM

## 2023-12-11 DIAGNOSIS — N76.0 ACUTE VAGINITIS: ICD-10-CM

## 2023-12-11 DIAGNOSIS — Z3A.28 28 WEEKS GESTATION OF PREGNANCY: ICD-10-CM

## 2023-12-11 DIAGNOSIS — O26.843 UTERINE SIZE-DATE DISCREPANCY, THIRD TRIMESTER: ICD-10-CM

## 2023-12-11 DIAGNOSIS — Z3A.39 39 WEEKS GESTATION OF PREGNANCY: ICD-10-CM

## 2023-12-11 DIAGNOSIS — B96.89 ACUTE VAGINITIS: ICD-10-CM

## 2023-12-11 PROCEDURE — 0503F POSTPARTUM CARE VISIT: CPT

## 2024-01-08 ENCOUNTER — APPOINTMENT (OUTPATIENT)
Dept: OBGYN | Facility: CLINIC | Age: 34
End: 2024-01-08
Payer: COMMERCIAL

## 2024-01-08 VITALS
WEIGHT: 134 LBS | DIASTOLIC BLOOD PRESSURE: 62 MMHG | BODY MASS INDEX: 22.33 KG/M2 | HEIGHT: 65 IN | SYSTOLIC BLOOD PRESSURE: 106 MMHG

## 2024-01-08 PROCEDURE — 0503F POSTPARTUM CARE VISIT: CPT

## 2024-01-08 NOTE — HISTORY OF PRESENT ILLNESS
[Postpartum Follow Up] : postpartum follow up [Complications:___] : complications include: [unfilled] [Delivery Date: ___] : on [unfilled] [] : delivered by vaginal delivery [Male] : Delivery History: baby boy [Wt. ___] : weighing [unfilled] [Breastfeeding] : currently nursing [Intended Contraception] : Intended Contraception: [Oral Contraceptives] : oral contraceptives [Back to Normal] : is back to normal in size [Normal] : the vagina was normal [FreeTextEntry8] : x6 week PPA.  [de-identified] : gestational age 40 weeks [de-identified] : Abdomen soft and non tender [de-identified] : Return x3-4 mos for annual.

## 2024-07-30 ENCOUNTER — APPOINTMENT (OUTPATIENT)
Dept: OBGYN | Facility: CLINIC | Age: 34
End: 2024-07-30
Payer: COMMERCIAL

## 2024-07-30 VITALS
SYSTOLIC BLOOD PRESSURE: 110 MMHG | WEIGHT: 123 LBS | HEIGHT: 65 IN | BODY MASS INDEX: 20.49 KG/M2 | DIASTOLIC BLOOD PRESSURE: 66 MMHG

## 2024-07-30 DIAGNOSIS — Z01.419 ENCOUNTER FOR GYNECOLOGICAL EXAMINATION (GENERAL) (ROUTINE) W/OUT ABNORMAL FINDINGS: ICD-10-CM

## 2024-07-30 PROCEDURE — 99395 PREV VISIT EST AGE 18-39: CPT

## 2024-07-30 NOTE — PHYSICAL EXAM
[Awake] : awake [Alert] : alert [Soft] : soft [Oriented x3] : oriented to person, place, and time [Normal] : cervix [Acute Distress] : no acute distress [Mass] : no breast mass [Nipple Discharge] : no nipple discharge [Axillary LAD] : no axillary lymphadenopathy [Tender] : non tender [FreeTextEntry4] : small Right Bartholin cyst was present which was soft and non tender

## 2024-07-30 NOTE — END OF VISIT
[TextEntry] : IPriscilla, am scribing for and the presence of Dr. Ying Bajwa the following sections HISTORY OF PRESENT ILLNESS, PAST MEDICAL/FAMILY/SOCIAL HISTORY; REVIEW OF SYSTEMS; PHYSICAL EXAM; DISPOSITION.

## 2024-08-05 LAB — CYTOLOGY CVX/VAG DOC THIN PREP: NORMAL

## 2025-08-05 ENCOUNTER — APPOINTMENT (OUTPATIENT)
Dept: OBGYN | Facility: CLINIC | Age: 35
End: 2025-08-05
Payer: COMMERCIAL

## 2025-08-05 VITALS
HEIGHT: 65 IN | BODY MASS INDEX: 19.16 KG/M2 | SYSTOLIC BLOOD PRESSURE: 96 MMHG | WEIGHT: 115 LBS | DIASTOLIC BLOOD PRESSURE: 64 MMHG

## 2025-08-05 DIAGNOSIS — Z01.419 ENCOUNTER FOR GYNECOLOGICAL EXAMINATION (GENERAL) (ROUTINE) W/OUT ABNORMAL FINDINGS: ICD-10-CM

## 2025-08-05 PROCEDURE — 99395 PREV VISIT EST AGE 18-39: CPT

## 2025-08-11 LAB — CYTOLOGY CVX/VAG DOC THIN PREP: ABNORMAL
